# Patient Record
Sex: FEMALE | Race: WHITE | Employment: FULL TIME | URBAN - METROPOLITAN AREA
[De-identification: names, ages, dates, MRNs, and addresses within clinical notes are randomized per-mention and may not be internally consistent; named-entity substitution may affect disease eponyms.]

---

## 2017-02-07 ENCOUNTER — RADIANT APPOINTMENT (OUTPATIENT)
Dept: GENERAL RADIOLOGY | Facility: CLINIC | Age: 57
End: 2017-02-07
Attending: PHYSICIAN ASSISTANT
Payer: COMMERCIAL

## 2017-02-07 ENCOUNTER — OFFICE VISIT (OUTPATIENT)
Dept: URGENT CARE | Facility: URGENT CARE | Age: 57
End: 2017-02-07
Payer: COMMERCIAL

## 2017-02-07 VITALS
OXYGEN SATURATION: 98 % | TEMPERATURE: 98 F | SYSTOLIC BLOOD PRESSURE: 120 MMHG | HEART RATE: 84 BPM | HEIGHT: 68 IN | DIASTOLIC BLOOD PRESSURE: 70 MMHG

## 2017-02-07 DIAGNOSIS — M25.572 ACUTE LEFT ANKLE PAIN: ICD-10-CM

## 2017-02-07 DIAGNOSIS — S80.12XA TRAUMATIC HEMATOMA OF LEFT LOWER LEG, INITIAL ENCOUNTER: ICD-10-CM

## 2017-02-07 DIAGNOSIS — S81.802A OPEN WOUND OF LOWER LIMB, LEFT, INITIAL ENCOUNTER: ICD-10-CM
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
## 2017-02-07 DIAGNOSIS — S80.12XA TRAUMATIC HEMATOMA OF LEFT LOWER LEG, INITIAL ENCOUNTER: Primary | ICD-10-CM

## 2017-02-07 PROCEDURE — 73610 X-RAY EXAM OF ANKLE: CPT | Mod: LT

## 2017-02-07 PROCEDURE — 90714 TD VACC NO PRESV 7 YRS+ IM: CPT | Performed by: PHYSICIAN ASSISTANT

## 2017-02-07 PROCEDURE — 73590 X-RAY EXAM OF LOWER LEG: CPT | Mod: LT

## 2017-02-07 PROCEDURE — 90471 IMMUNIZATION ADMIN: CPT | Performed by: PHYSICIAN ASSISTANT

## 2017-02-07 PROCEDURE — 99214 OFFICE O/P EST MOD 30 MIN: CPT | Mod: 25 | Performed by: PHYSICIAN ASSISTANT

## 2017-02-07 RX ORDER — HYDROCODONE BITARTRATE AND ACETAMINOPHEN 5; 325 MG/1; MG/1
1-2 TABLET ORAL EVERY 6 HOURS PRN
Qty: 20 TABLET | Refills: 0 | Status: SHIPPED | OUTPATIENT
Start: 2017-02-07 | End: 2018-03-14

## 2017-02-07 RX ORDER — IBUPROFEN 600 MG/1
600 TABLET, FILM COATED ORAL EVERY 6 HOURS PRN
Qty: 3 TABLET | Refills: 0
Start: 2017-02-07 | End: 2018-03-14

## 2017-02-07 NOTE — PROGRESS NOTES
"SUBJECTIVE:  Chief Complaint   Patient presents with     Trauma     Pt slipped and fell on her stairs today and injured her left lower leg.     Urgent Care     Racheal Larsen is a 57 year old female presents with a chief complaint of left ankle and lower leg injury from falling on ice.  The injury occurred 2 hours(s) ago.   The injury happened while at home. How: fall on the ice.  The patient complained of mild and moderate pain  and has had decreased ROM.  Pain exacerbated by walking and weight-bearing.  Relieved by ice.  She treated it initially with ice. This is the first time this type of injury has occurred to this patient.     Past Medical History   Diagnosis Date     Depressive disorder      Thyroid disease      PE (pulmonary embolism)      ALLERGIES  No Known Allergies     Social History   Substance Use Topics     Smoking status: Never Smoker      Smokeless tobacco: Never Used     Alcohol Use: No       ROS:  CONSTITUTIONAL:NEGATIVE for fever, chills, change in weight  INTEGUMENTARY/SKIN: POSITIVE for abrasion of left lower leg  ENT/MOUTH: NEGATIVE for ear, mouth and throat problems  RESP:NEGATIVE for significant cough or SOB  CV: NEGATIVE for chest pain, palpitations or peripheral edema  GI: NEGATIVE for nausea, abdominal pain, heartburn, or change in bowel habits  MUSCULOSKELETAL: Positive for left lower leg tenderness, localized hematoma on leg and left ankle pain  NEURO: NEGATIVE for weakness, dizziness or paresthesias    EXAM:   /70 mmHg  Pulse 84  Temp(Src) 98  F (36.7  C)  Ht 5' 8\" (1.727 m)  SpO2 98%  Gen: healthy, alert, active and healthy,alert,no distress  Extremity: left lower leg has anterior leg hematoma, bruising.   There is not compromise to the distal circulation.  Pulses are +2 and CRT is brisk  EXTREMITIES: peripheral pulses normal  MS:  Positive for left ankle tenderness, localized pain.  Positive for large anterior leg hematoma  SKIN: Positive for left anterior leg hematoma " and abrasion  NEURO: Normal strength and tone, sensory exam grossly normal, mentation intact and speech normal    X-RAY was done and negative for acute changes including fracture Xray read by Dung Rasheed at time of visit    ASSESSMENT/PLAN:      ICD-10-CM    1. Traumatic hematoma of left lower leg, initial encounter S80.12XA ibuprofen (ADVIL/MOTRIN) 600 MG tablet     XR Tibia & Fibula Left 2 Views     HYDROcodone-acetaminophen (NORCO) 5-325 MG per tablet   2. Acute left ankle pain M25.572 ibuprofen (ADVIL/MOTRIN) 600 MG tablet     XR Tibia & Fibula Left 2 Views     XR Ankle Left G/E 3 Views     HYDROcodone-acetaminophen (NORCO) 5-325 MG per tablet   3. Fall from slipping on ice, initial encounter W00.9XXA ibuprofen (ADVIL/MOTRIN) 600 MG tablet     XR Tibia & Fibula Left 2 Views   4. Open wound of lower limb, left, initial encounter S81.802A TD PRESERV FREE >=7 YRS ADS IM     RICE treatment: Rest, Ice, compression, elevation   Ace wrap for comfort  Follow up with PCP as needed

## 2017-02-07 NOTE — Clinical Note
[facility] [place] [facility]  [address] [address]  [phone]      February 7, 2017    RE:  Racheal Larsen                                                                                                                                                       [address] [facility] [address]            To whom it may concern:    Racheal Larsen was seen in the urgent care today for     Traumatic hematoma of left lower leg, initial encounter  Acute left ankle pain  Fall from slipping on ice, initial encounter  Open wound of lower limb, left, initial encounter.          Sincerely,        Dung Rasheed [facility] PAC    [facility]

## 2017-02-07 NOTE — NURSING NOTE
"Chief Complaint   Patient presents with     Trauma     Pt slipped and fell on her stairs today and injured her left lower leg.     Urgent Care       Initial /70 mmHg  Pulse 84  Temp(Src) 98  F (36.7  C)  Ht 5' 8\" (1.727 m)  SpO2 98% Estimated body mass index is 28.14 kg/(m^2) as calculated from the following:    Height as of this encounter: 5' 8\" (1.727 m).    Weight as of 10/5/16: 185 lb (83.915 kg).  Medication Reconciliation: complete    "

## 2017-02-07 NOTE — NURSING NOTE
Screening Questionnaire for Adult Immunization    Are you sick today?   No   Do you have allergies to medications, food, a vaccine component or latex?   No   Have you ever had a serious reaction after receiving a vaccination?   No   Do you have a long-term health problem with heart disease, lung disease, asthma, kidney disease, metabolic disease (e.g. diabetes), anemia, or other blood disorder?   No   Do you have cancer, leukemia, HIV/AIDS, or any other immune system problem?   No   In the past 3 months, have you taken medications that affect  your immune system, such as prednisone, other steroids, or anticancer drugs; drugs for the treatment of rheumatoid arthritis, Crohn s disease, or psoriasis; or have you had radiation treatments?   No   Have you had a seizure, or a brain or other nervous system problem?   No   During the past year, have you received a transfusion of blood or blood     products, or been given immune (gamma) globulin or antiviral drug?   No   For women: Are you pregnant or is there a chance you could become        pregnant during the next month?   No   Have you received any vaccinations in the past 4 weeks?   No     Immunization questionnaire answers were all negative.      MNVFC doesn't apply on this patient    Per orders of Dung Rasheed PA-C, injection of TD given by Alicia Luz. Patient instructed to remain in clinic for 20 minutes afterwards, and to report any adverse reaction to me immediately.       Screening performed by Alicia Luz on 2/7/2017 at 12:22 PM.

## 2018-03-14 ENCOUNTER — RADIANT APPOINTMENT (OUTPATIENT)
Dept: GENERAL RADIOLOGY | Facility: CLINIC | Age: 58
End: 2018-03-14
Attending: PHYSICIAN ASSISTANT
Payer: COMMERCIAL

## 2018-03-14 ENCOUNTER — OFFICE VISIT (OUTPATIENT)
Dept: URGENT CARE | Facility: URGENT CARE | Age: 58
End: 2018-03-14
Payer: COMMERCIAL

## 2018-03-14 VITALS
HEART RATE: 79 BPM | OXYGEN SATURATION: 97 % | SYSTOLIC BLOOD PRESSURE: 117 MMHG | WEIGHT: 193 LBS | TEMPERATURE: 98.1 F | RESPIRATION RATE: 10 BRPM | DIASTOLIC BLOOD PRESSURE: 68 MMHG | BODY MASS INDEX: 29.35 KG/M2

## 2018-03-14 DIAGNOSIS — R50.9 FEVER AND CHILLS: ICD-10-CM

## 2018-03-14 DIAGNOSIS — R06.2 WHEEZING: ICD-10-CM

## 2018-03-14 DIAGNOSIS — R05.8 PRODUCTIVE COUGH: Primary | ICD-10-CM

## 2018-03-14 LAB
FLUAV+FLUBV AG SPEC QL: NEGATIVE
FLUAV+FLUBV AG SPEC QL: NEGATIVE
SPECIMEN SOURCE: NORMAL

## 2018-03-14 PROCEDURE — 94640 AIRWAY INHALATION TREATMENT: CPT | Performed by: PHYSICIAN ASSISTANT

## 2018-03-14 PROCEDURE — 71046 X-RAY EXAM CHEST 2 VIEWS: CPT | Mod: FY

## 2018-03-14 PROCEDURE — 87804 INFLUENZA ASSAY W/OPTIC: CPT | Performed by: PHYSICIAN ASSISTANT

## 2018-03-14 PROCEDURE — 99214 OFFICE O/P EST MOD 30 MIN: CPT | Mod: 25 | Performed by: PHYSICIAN ASSISTANT

## 2018-03-14 RX ORDER — CODEINE PHOSPHATE AND GUAIFENESIN 10; 100 MG/5ML; MG/5ML
1-2 SOLUTION ORAL EVERY 4 HOURS PRN
Qty: 240 ML | Refills: 0 | Status: SHIPPED | OUTPATIENT
Start: 2018-03-14 | End: 2020-05-26

## 2018-03-14 RX ORDER — ALBUTEROL SULFATE 0.83 MG/ML
SOLUTION RESPIRATORY (INHALATION)
Qty: 1 VIAL | Refills: 0
Start: 2018-03-14 | End: 2020-05-26

## 2018-03-14 RX ORDER — ALBUTEROL SULFATE 90 UG/1
2 AEROSOL, METERED RESPIRATORY (INHALATION) EVERY 6 HOURS PRN
Qty: 1 INHALER | Refills: 0 | Status: SHIPPED | OUTPATIENT
Start: 2018-03-14 | End: 2020-05-26

## 2018-03-14 RX ORDER — PREDNISONE 20 MG/1
20 TABLET ORAL DAILY
Qty: 5 TABLET | Refills: 0 | Status: SHIPPED | OUTPATIENT
Start: 2018-03-14 | End: 2020-05-26

## 2018-03-14 RX ORDER — DOXYCYCLINE 100 MG/1
100 CAPSULE ORAL 2 TIMES DAILY
Qty: 28 CAPSULE | Refills: 0 | Status: SHIPPED | OUTPATIENT
Start: 2018-03-14 | End: 2018-03-28

## 2018-03-14 NOTE — LETTER
[facility] [place] [facility]  [address] [address]  [phone]      March 14, 2018    RE:  Racheal Larsen                                                                                                                                                       [address] [address] [facility] [address]            To whom it may concern:    Racheal Larsen was seen in clinic today for illness.  She may return to work when she has been fever free for 24 hours.          Sincerely,        Yoko Lugo [facility]

## 2018-03-14 NOTE — MR AVS SNAPSHOT
After Visit Summary   3/14/2018    Racheal Larsen    MRN: [id number]           Patient Information     Date Of Birth          1960        Visit Information        Provider Department      3/14/2018 9:15 AM Yoko Choudhary PA-C [facility]        Today's Diagnoses     Productive cough    -  1    Fever and chills        Wheezing          Care Instructions    (R05) Productive cough  (primary encounter diagnosis)  Comment:   Plan: predniSONE (DELTASONE) 20 MG tablet,         doxycycline monohydrate 100 MG capsule,         guaiFENesin-codeine (ROBITUSSIN AC) 100-10         MG/5ML SOLN solution            (R50.9) Fever and chills  Comment:   Plan: Influenza A/B antigen, ALBUTEROL UNIT DOSE, 1         MG -             (R06.2) Wheezing  Comment:   Plan: INHALATION/NEBULIZER TREATMENT, INITIAL,         albuterol (2.5 MG/3ML) 0.083% neb solution, XR         Chest 2 Views, ALBUTEROL UNIT DOSE, 1 MG -         , albuterol (PROAIR HFA/PROVENTIL         HFA/VENTOLIN HFA) 108 (90 BASE) MCG/ACT Inhaler          Follow up with Primary clinic in 48-72 hours for re-check, sooner should symptoms persist, to ED should symptoms worsen.            Follow-ups after your visit        Who to contact     If you have questions or need follow up information about today's clinic visit or your schedule please contact [facility] directly at [phone].  Normal or non-critical lab and imaging results will be communicated to you by [email or web address], letter or phone within 4 business days after the clinic has received the results. If you do not hear from us within 7 days, please contact the clinic through [email or web address] or phone. If you have a critical or abnormal lab result, we will notify you by phone as soon as possible.  Submit refill requests through [employer] or call your pharmacy and they will forward the refill request to us. Please allow 3 business days for  "your refill to be completed.          Additional Information About Your Visit        [employer] Information     citizenmade lets you send messages to your doctor, view your test results, renew your prescriptions, schedule appointments and more. To sign up, go to [email or web address] . Click on \"Log in\" on the left side of the screen, which will take you to the Welcome page. Then click on \"Sign up Now\" on the right side of the page.     You will be asked to enter the access code listed below, as well as some personal information. Please follow the directions to create your username and password.     Your access code is: [email or web address]  Expires: 2018 11:06 AM     Your access code will  in 90 days. If you need help or a new code, please call your [facility] clinic or [phone].        Care EveryWhere ID     This is your Care EveryWhere ID. This could be used by other organizations to access your [facility] medical records  [id number]        Your Vitals Were     Pulse Temperature Respirations Pulse Oximetry BMI (Body Mass Index)       79 98.1  F (36.7  C) (Oral) 10 97% 29.35 kg/m2        Blood Pressure from Last 3 Encounters:   18 117/68   17 120/70   10/05/16 115/75    Weight from Last 3 Encounters:   18 193 lb (87.5 kg)   10/05/16 185 lb (83.9 kg)   12/14/15 172 lb 11.2 oz (78.3 kg)              We Performed the Following     ALBUTEROL UNIT DOSE, 1 MG -      Influenza A/B antigen     INHALATION/NEBULIZER TREATMENT, INITIAL     Nursing Communication 1          Today's Medication Changes          These changes are accurate as of 3/14/18 11:06 AM.  If you have any questions, ask your nurse or doctor.               Start taking these medicines.        Dose/Directions    doxycycline monohydrate 100 MG capsule   Used for:  Productive cough   Started by:  Yoko Choudhary PA-C        Dose:  100 mg   Take 1 capsule (100 mg) by mouth 2 times daily for 14 days   Quantity:  28 capsule "   Refills:  0       guaiFENesin-codeine 100-10 MG/5ML Soln solution   Commonly known as:  ROBITUSSIN AC   Used for:  Productive cough   Started by:  Yoko Choudhary PA-C        Dose:  1-2 tsp.   Take 5-10 mLs by mouth every 4 hours as needed for cough   Quantity:  240 mL   Refills:  0       predniSONE 20 MG tablet   Commonly known as:  DELTASONE   Used for:  Productive cough   Started by:  Yoko Choudhary PA-C        Dose:  20 mg   Take 1 tablet (20 mg) by mouth daily   Quantity:  5 tablet   Refills:  0         These medicines have changed or have updated prescriptions.        Dose/Directions    * albuterol (2.5 MG/3ML) 0.083% neb solution   This may have changed:    - how much to take  - how to take this  - when to take this  - additional instructions   Used for:  Wheezing   Changed by:  Yoko Choudhary PA-C        In clinic   Quantity:  1 vial   Refills:  0       * albuterol 108 (90 BASE) MCG/ACT Inhaler   Commonly known as:  PROAIR HFA/PROVENTIL HFA/VENTOLIN HFA   This may have changed:  You were already taking a medication with the same name, and this prescription was added. Make sure you understand how and when to take each.   Used for:  Wheezing   Changed by:  Yoko Choudhary PA-C        Dose:  2 puff   Inhale 2 puffs into the lungs every 6 hours as needed for shortness of breath / dyspnea or wheezing   Quantity:  1 Inhaler   Refills:  0       * Notice:  This list has 2 medication(s) that are the same as other medications prescribed for you. Read the directions carefully, and ask your doctor or other care provider to review them with you.      Stop taking these medicines if you haven't already. Please contact your care team if you have questions.     HYDROcodone-acetaminophen 5-325 MG per tablet   Commonly known as:  NORCO   Stopped by:  Yoko Choudhary PA-C           ibuprofen 600 MG tablet   Commonly known as:  ADVIL/MOTRIN   Stopped by:  Megan  Yoko Lugo PA-C                Where to get your medicines      These medications were sent to [facility] - [place], [place] - [address] AT [facility] [address] [facility] [address]    Hours:  24-hours Phone:  [phone]     albuterol 108 (90 BASE) MCG/ACT Inhaler    doxycycline monohydrate 100 MG capsule    predniSONE 20 MG tablet         Some of these will need a paper prescription and others can be bought over the counter.  Ask your nurse if you have questions.     Bring a paper prescription for each of these medications     guaiFENesin-codeine 100-10 MG/5ML Soln solution       You don't need a prescription for these medications     albuterol (2.5 MG/3ML) 0.083% neb solution                Primary Care Provider Office Phone # Fax #    Marychuy Grimes [phone]       [facility] [address]  [facility] [address]        Equal Access to Services     [facility]: Hadii aad ku hadasho Soomaali, waaxda luqadaha, qaybta kaalmada adeegyada, waxay idiin hayaan adeeg kharapolina arthur . [facility] [phone].    ATENCIÓN: Si habla español, tiene a cronin disposición servicios gratuitos de asistencia lingüística. Llame al [phone].    We comply with applicable federal civil rights laws and [place] laws. We do not discriminate on the basis of race, color, national origin, age, disability, sex, sexual orientation, or gender identity.            Thank you!     Thank you for choosing [facility]  for your care. Our goal is always to provide you with excellent care. Hearing back from our patients is one way we can continue to improve our services. Please take a few minutes to complete the written survey that you may receive in the mail after your visit with us. Thank you!             Your Updated Medication List - Protect others around you: Learn how to safely use, store and throw away your medicines at  www.disposemymeds.org.          This list is accurate as of 3/14/18 11:06 AM.  Always use your most recent med list.                   Brand Name Dispense Instructions for use Diagnosis    * albuterol (2.5 MG/3ML) 0.083% neb solution     1 vial    In clinic    Wheezing       * albuterol 108 (90 BASE) MCG/ACT Inhaler    PROAIR HFA/PROVENTIL HFA/VENTOLIN HFA    1 Inhaler    Inhale 2 puffs into the lungs every 6 hours as needed for shortness of breath / dyspnea or wheezing    Wheezing       doxycycline monohydrate 100 MG capsule     28 capsule    Take 1 capsule (100 mg) by mouth 2 times daily for 14 days    Productive cough       guaiFENesin-codeine 100-10 MG/5ML Soln solution    ROBITUSSIN AC    240 mL    Take 5-10 mLs by mouth every 4 hours as needed for cough    Productive cough       LEVOTHROID PO      Take 75 mcg by mouth daily        LORazepam 0.5 MG tablet    ATIVAN    20 tablet    Take 1 tablet (0.5 mg) by mouth every 8 hours as needed for anxiety        predniSONE 20 MG tablet    DELTASONE    5 tablet    Take 1 tablet (20 mg) by mouth daily    Productive cough       * Notice:  This list has 2 medication(s) that are the same as other medications prescribed for you. Read the directions carefully, and ask your doctor or other care provider to review them with you.

## 2018-03-14 NOTE — PROGRESS NOTES
SUBJECTIVE:   Racheal Larsen is a 58 year old female presenting with a chief complaint of   1) productive cough with chest discomfort for 2 days  2) sinus congestion for 10 days.  3) body aches since last night  Onset of symptoms was as above.  Course of illness is worsening.    Severity moderate  Current and Associated symptoms: fever last night 101.4.  Malaise.    Has not taken any medications today and is afebrile in clinic  Treatment measures tried include none.  Predisposing factors include DID have flu vaccination this season.    SH: patient is here with her spouse Tricia    Past Medical History:   Diagnosis Date     Depressive disorder      PE (pulmonary embolism)      Thyroid disease      Patient Active Problem List   Diagnosis     Actinic keratosis     Adjustment disorder with mixed anxiety and depressed mood     Grief     Mixed anxiety depressive disorder     Depression     Dysthymia     Hypercholesterolemia     Stress incontinence in female     Headache     Hypothyroidism     Atypical nevus     Sleep disorder     Social History   Substance Use Topics     Smoking status: Never Smoker     Smokeless tobacco: Never Used     Alcohol use No       ROS:  CONSTITUTIONAL:as per HPI  INTEGUMENTARY/SKIN: NEGATIVE for worrisome rashes, moles or lesions  EYES: NEGATIVE for vision changes or irritation  ENT/MOUTH: as per HPI  RESP:as per HPI  CV: NEGATIVE for chest pain, palpitations or peripheral edema  GI: NEGATIVE for nausea, abdominal pain, heartburn, or change in bowel habits  MUSCULOSKELETAL: as per HPI  Review of systems negative except as stated above.    OBJECTIVE  :/68  Pulse 79  Temp 98.1  F (36.7  C) (Oral)  Resp 10  Wt 193 lb (87.5 kg)  SpO2 97%  BMI 29.35 kg/m2  GENERAL APPEARANCE: healthy, alert and no distress  EYES: EOMI,  PERRL, conjunctiva clear  HENT: ear canals and TM's normal.  Nose and mouth without ulcers, erythema or lesions  NECK: supple, nontender, no lymphadenopathy  RESP:  wheezing throughout which improves with neb but does not clear  CV: regular rates and rhythm, normal S1 S2, no murmur noted  ABDOMEN:  soft, nontender, no HSM or masses and bowel sounds normal  NEURO: Normal strength and tone, sensory exam grossly normal,  normal speech and mentation  SKIN: no suspicious lesions or rashes    CXR: possible infiltrate    (R05) Productive cough  (primary encounter diagnosis)  Comment:   Plan: predniSONE (DELTASONE) 20 MG tablet,         doxycycline monohydrate 100 MG capsule,         guaiFENesin-codeine (ROBITUSSIN AC) 100-10         MG/5ML SOLN solution            (R50.9) Fever and chills  Comment:   Plan: Influenza A/B antigen, ALBUTEROL UNIT DOSE, 1         MG -             (R06.2) Wheezing  Comment:   Plan: INHALATION/NEBULIZER TREATMENT, INITIAL,         albuterol (2.5 MG/3ML) 0.083% neb solution, XR         Chest 2 Views, ALBUTEROL UNIT DOSE, 1 MG -         , albuterol (PROAIR HFA/PROVENTIL         HFA/VENTOLIN HFA) 108 (90 BASE) MCG/ACT Inhaler          Follow up with Primary clinic in 48-72 hours for re-check, sooner should symptoms persist, to ED should symptoms worsen.      Patient and her spouse express understanding and agreement with the assessment and plan as above.

## 2018-03-14 NOTE — PATIENT INSTRUCTIONS
(R05) Productive cough  (primary encounter diagnosis)  Comment:   Plan: predniSONE (DELTASONE) 20 MG tablet,         doxycycline monohydrate 100 MG capsule,         guaiFENesin-codeine (ROBITUSSIN AC) 100-10         MG/5ML SOLN solution            (R50.9) Fever and chills  Comment:   Plan: Influenza A/B antigen, ALBUTEROL UNIT DOSE, 1         MG -             (R06.2) Wheezing  Comment:   Plan: INHALATION/NEBULIZER TREATMENT, INITIAL,         albuterol (2.5 MG/3ML) 0.083% neb solution, XR         Chest 2 Views, ALBUTEROL UNIT DOSE, 1 MG -         , albuterol (PROAIR HFA/PROVENTIL         HFA/VENTOLIN HFA) 108 (90 BASE) MCG/ACT Inhaler          Follow up with Primary clinic in 48-72 hours for re-check, sooner should symptoms persist, to ED should symptoms worsen.

## 2018-04-02 DIAGNOSIS — R06.2 WHEEZING: ICD-10-CM

## 2018-04-02 RX ORDER — ALBUTEROL SULFATE 90 UG/1
AEROSOL, METERED RESPIRATORY (INHALATION)
Qty: 8.5 G | Refills: 0 | OUTPATIENT
Start: 2018-04-02

## 2019-07-26 ENCOUNTER — HOSPITAL LABORATORY (OUTPATIENT)
Dept: OTHER | Facility: CLINIC | Age: 59
End: 2019-07-26

## 2019-07-28 LAB
BACTERIA SPEC CULT: NORMAL
Lab: NORMAL
SPECIMEN SOURCE: NORMAL

## 2020-05-26 ENCOUNTER — ANCILLARY PROCEDURE (OUTPATIENT)
Dept: GENERAL RADIOLOGY | Facility: CLINIC | Age: 60
End: 2020-05-26
Attending: PHYSICIAN ASSISTANT
Payer: COMMERCIAL

## 2020-05-26 ENCOUNTER — OFFICE VISIT (OUTPATIENT)
Dept: URGENT CARE | Facility: URGENT CARE | Age: 60
End: 2020-05-26
Payer: COMMERCIAL

## 2020-05-26 VITALS — TEMPERATURE: 98.1 F | DIASTOLIC BLOOD PRESSURE: 78 MMHG | SYSTOLIC BLOOD PRESSURE: 137 MMHG | HEART RATE: 85 BPM

## 2020-05-26 DIAGNOSIS — S99.911A ANKLE INJURY, RIGHT, INITIAL ENCOUNTER: ICD-10-CM

## 2020-05-26 DIAGNOSIS — S99.911A ANKLE INJURY, RIGHT, INITIAL ENCOUNTER: Primary | ICD-10-CM

## 2020-05-26 PROCEDURE — 99214 OFFICE O/P EST MOD 30 MIN: CPT | Performed by: PHYSICIAN ASSISTANT

## 2020-05-26 PROCEDURE — 73610 X-RAY EXAM OF ANKLE: CPT | Mod: RT

## 2020-05-27 NOTE — PATIENT INSTRUCTIONS
Patient Education       (R55.893V) Ankle injury, right, initial encounter  (primary encounter diagnosis)  Comment:   Plan: XR Ankle Right G/E 3 Views,         acetaminophen-codeine (TYLENOL #3) 300-30 MG         tablet          Wear air cast for one week.  Follow up with Ortho should symptoms persist or worsen.        Treating Ankle Sprains  Treatment will depend on how bad your sprain is. For a severe sprain, healing may take 3 months or more.  Right after your injury: Use R.I.C.E.    BIG: Rest: At first, keep weight off the ankle as much as you can. You may be given crutches to help you walk without putting weight on the ankle.    BIG: Ice: Put an ice pack on the ankle for 20 minutes. Remove the pack and wait at least 30 minutes. Repeat for up to 3 days. This helps reduce swelling.    BIG: Compression: To reduce swelling and keep the joint stable, you may need to wrap the ankle with an elastic bandage. For more severe sprains, you may need an ankle brace, a boot, or a cast.    BIG: Elevation: To reduce swelling, keep your ankle raised above your heart when you sit or lie down.  Medicine  Your healthcare provider may suggest oral nonsteroidal anti-inflammatory medicine (NSAIDs), such as ibuprofen. This relieves the pain and helps reduce swelling. Be sure to take your medicine as directed.  Exercises    After about 2 to 3 weeks, you may be given exercises to strengthen the ligaments and muscles in the ankle. Doing these exercises will help prevent another ankle sprain. Exercises may include standing on your toes and then on your heels and doing ankle curls.    Sit on the edge of a sturdy table or lie on your back.    Pull your toes toward you. Then point them away from you. Repeat for 2 to 3 minutes.  Date Last Reviewed: 1/1/2018 2000-2019 [employer] [address] [place] [address], [place] [address]. All rights reserved. This information is not intended as a substitute for professional medical care.  Always follow your healthcare professional's instructions.           Patient Education     Aircast   Traditional splints and casts for the foot and ankle protect the injury by preventing movement at the joints. However, many injuries heal better and faster if the injured joint can be moved, while protected at the same time. This is the reason for using an Aircast.  There are two common type of AirCasts:  1) Air-Stirrup  ankle splint  This is often used to treat ankle sprains. It contains padded air cells in a plastic frame that fits into your shoe. This allows you to walk while preventing the ankle joint from rolling in or out causing re-injury.  Ankle sprains can take 4-6 weeks to heal. Persons with severe injuries or over age 60 may require more time to heal. During that time, you are prone to re-injury by suddenly twisting your ankle again while the ligaments are still weak.  When treating a sprain, the Air-Stirrup splint should be worn whenever walking for at least four weeks, or as long as you continue to have ankle pain. You should continue to wear it at least 6 weeks whenever running, playing sports or any activity where there is increased risk of re-injury. Talk to your doctor for specific advice about the treatment of your condition.  2) SP-Walker  boot  This is a short boot that provides support and protection to the foot and ankle while allowing you to walk. It contains padded air cells that provide compression and help circulation. It is used for both foot and ankle injuries - both sprains and minor fractures. Talk to your doctor for specific advice about the treatment of your condition.  Air-Stirrup  and SP-Walker  are trademarks of [employer].  For more information about their products, see www.aircast.com.    [id number] [employer]. [address] [place] [address], [place] [address]. All rights reserved. This information is not intended as a substitute for professional medical care. Always  follow your healthcare professional's instructions.

## 2020-05-27 NOTE — PROGRESS NOTES
Patient presents with:  Urgent Care: right ankle injury from fall today.     SUBJECTIVE:  Chief Complaint   Patient presents with     Urgent Care     right ankle injury from fall today.      Racheal Larsen is a 60 year old female presents with a chief complaint of right ankle pain.  The injury occurred just prior to arrival in clinic.  She inverted her ankle on mud while walking her dogs.  Denies any other injury.  The injury happened while while walking. How: as aboveimmediate pain.  The patient complained of moderate pain  and has had decreased ROM.  Pain exacerbated by walking and weight-bearing.  Relieved by nothing.  She treated it initially with ice and a cam walker (has her own). This is the first time this type of injury has occurred to this patient.     Past Medical History:   Diagnosis Date     Depressive disorder      PE (pulmonary embolism)      Thyroid disease      Patient Active Problem List   Diagnosis     Actinic keratosis     Adjustment disorder with mixed anxiety and depressed mood     Grief     Mixed anxiety depressive disorder     Depression     Dysthymia     Hypercholesterolemia     Stress incontinence in female     Headache     Hypothyroidism     Atypical nevus     Sleep disorder     Social History     Tobacco Use     Smoking status: Never Smoker     Smokeless tobacco: Never Used   Substance Use Topics     Alcohol use: No       ROS:  CONSTITUTIONAL:NEGATIVE for fever, chills, change in weight  INTEGUMENTARY/SKIN: NEGATIVE for worrisome rashes, moles or lesions  ENT/MOUTH: NEGATIVE for ear, mouth and throat problems  RESP:NEGATIVE for significant cough or SOB  CV: NEGATIVE for chest pain, palpitations or peripheral edema  GI: NEGATIVE for nausea, abdominal pain, heartburn, or change in bowel habits  MUSCULOSKELETAL: as per HPI  NEURO: NEGATIVE for weakness, dizziness or paresthesias  HEME/ALLERGY/IMMUNE: NEGATIVE for bleeding problems  Review of systems negative except as stated  above.    EXAM:   /78   Pulse 85   Temp 98.1  F (36.7  C) (Oral)   Gen: healthy,alert,no distress  Extremity: right ankle has lateral malleolus swelling and tenderness.    There is not compromise to the distal circulation.  SKIN: no suspicious lesions or rashes  NEURO: Normal strength and tone, sensory exam grossly normal, mentation intact and speech normal    X-RAY was done.  No fractures as per my interpretation during clinic visit.        (H87.213P) Ankle injury, right, initial encounter  (primary encounter diagnosis)  Comment:   Plan: XR Ankle Right G/E 3 Views,         acetaminophen-codeine (TYLENOL #3) 300-30 MG         tablet          Wear air cast for one week.  Follow up with Ortho should symptoms persist or worsen.        Patient expresses understanding and agreement with the assessment and plan as above.

## 2022-03-10 ENCOUNTER — APPOINTMENT (OUTPATIENT)
Dept: URBAN - METROPOLITAN AREA CLINIC 253 | Age: 62
Setting detail: DERMATOLOGY
End: 2022-03-14

## 2022-03-10 VITALS — RESPIRATION RATE: 14 BRPM | WEIGHT: 175 LBS | HEIGHT: 68 IN

## 2022-03-10 DIAGNOSIS — L72.0 EPIDERMAL CYST: ICD-10-CM

## 2022-03-10 DIAGNOSIS — L21.8 OTHER SEBORRHEIC DERMATITIS: ICD-10-CM

## 2022-03-10 DIAGNOSIS — L73.8 OTHER SPECIFIED FOLLICULAR DISORDERS: ICD-10-CM

## 2022-03-10 DIAGNOSIS — L57.0 ACTINIC KERATOSIS: ICD-10-CM

## 2022-03-10 DIAGNOSIS — L81.4 OTHER MELANIN HYPERPIGMENTATION: ICD-10-CM

## 2022-03-10 DIAGNOSIS — D49.2 NEOPLASM OF UNSPECIFIED BEHAVIOR OF BONE, SOFT TISSUE, AND SKIN: ICD-10-CM

## 2022-03-10 DIAGNOSIS — D22 MELANOCYTIC NEVI: ICD-10-CM

## 2022-03-10 DIAGNOSIS — D18.0 HEMANGIOMA: ICD-10-CM

## 2022-03-10 DIAGNOSIS — L82.1 OTHER SEBORRHEIC KERATOSIS: ICD-10-CM

## 2022-03-10 DIAGNOSIS — Z71.89 OTHER SPECIFIED COUNSELING: ICD-10-CM

## 2022-03-10 PROBLEM — D18.01 HEMANGIOMA OF SKIN AND SUBCUTANEOUS TISSUE: Status: ACTIVE | Noted: 2022-03-10

## 2022-03-10 PROBLEM — D22.5 MELANOCYTIC NEVI OF TRUNK: Status: ACTIVE | Noted: 2022-03-10

## 2022-03-10 PROCEDURE — OTHER PRESCRIPTION: OTHER

## 2022-03-10 PROCEDURE — OTHER BIOPSY BY SHAVE METHOD: OTHER

## 2022-03-10 PROCEDURE — OTHER MIPS QUALITY: OTHER

## 2022-03-10 PROCEDURE — 99204 OFFICE O/P NEW MOD 45 MIN: CPT | Mod: 25

## 2022-03-10 PROCEDURE — 11102 TANGNTL BX SKIN SINGLE LES: CPT

## 2022-03-10 PROCEDURE — 11103 TANGNTL BX SKIN EA SEP/ADDL: CPT

## 2022-03-10 PROCEDURE — OTHER COUNSELING: OTHER

## 2022-03-10 RX ORDER — TRETIONIN 0.25 MG/G
0.025% CREAM TOPICAL QHS
Qty: 45 | Refills: 1 | Status: ERX | COMMUNITY
Start: 2022-03-10

## 2022-03-10 RX ORDER — FLUOCINONIDE 0.5 MG/ML
0.05% SOLUTION TOPICAL BID
Qty: 60 | Refills: 1 | Status: ERX | COMMUNITY
Start: 2022-03-10

## 2022-03-10 RX ORDER — KETOCONAZOLE 20 MG/ML
2% SHAMPOO, SUSPENSION TOPICAL QD
Qty: 120 | Refills: 5 | Status: ERX | COMMUNITY
Start: 2022-03-10

## 2022-03-10 ASSESSMENT — LOCATION DETAILED DESCRIPTION DERM
LOCATION DETAILED: MID-FRONTAL SCALP
LOCATION DETAILED: LEFT LATERAL BREAST 1-2:00 REGION
LOCATION DETAILED: INFERIOR THORACIC SPINE
LOCATION DETAILED: LEFT DORSAL RING FINGER METACARPOPHALANGEAL JOINT
LOCATION DETAILED: RIGHT SUPERIOR MEDIAL UPPER BACK
LOCATION DETAILED: RIGHT ANTERIOR SHOULDER
LOCATION DETAILED: LEFT LATERAL ABDOMEN
LOCATION DETAILED: LEFT CENTRAL MALAR CHEEK
LOCATION DETAILED: RIGHT ANTERIOR SHOULDER
LOCATION DETAILED: LEFT INFERIOR CENTRAL MALAR CHEEK
LOCATION DETAILED: RIGHT SUPERIOR MEDIAL MALAR CHEEK

## 2022-03-10 ASSESSMENT — LOCATION ZONE DERM
LOCATION ZONE: ARM
LOCATION ZONE: ARM
LOCATION ZONE: SCALP
LOCATION ZONE: FACE
LOCATION ZONE: HAND
LOCATION ZONE: TRUNK

## 2022-03-10 ASSESSMENT — LOCATION SIMPLE DESCRIPTION DERM
LOCATION SIMPLE: RIGHT UPPER BACK
LOCATION SIMPLE: ANTERIOR SCALP
LOCATION SIMPLE: RIGHT SHOULDER
LOCATION SIMPLE: LEFT BREAST
LOCATION SIMPLE: UPPER BACK
LOCATION SIMPLE: LEFT CHEEK
LOCATION SIMPLE: ABDOMEN
LOCATION SIMPLE: RIGHT CHEEK
LOCATION SIMPLE: RIGHT SHOULDER
LOCATION SIMPLE: LEFT HAND

## 2022-03-10 NOTE — PROCEDURE: COUNSELING
Detail Level: Detailed
Detail Level: Zone
Detail Level: Generalized
Patient Specific Counseling (Will Not Stick From Patient To Patient): Discussed acne facial with Lucina VALENCIA \\nOffered a topical retinoid.
Patient Specific Counseling (Will Not Stick From Patient To Patient): She states it bothers her more in the winter.

## 2022-03-10 NOTE — PROCEDURE: BIOPSY BY SHAVE METHOD
Electrodesiccation Text: The wound bed was treated with electrodesiccation after the biopsy was performed.
Validate Note Data (See Information Below): No
Cryotherapy Text: The wound bed was treated with cryotherapy after the biopsy was performed.
Hemostasis: Drysol
Biopsy Method: Dermablade
Render Post-Care Instructions In Note?: yes
X Size Of Lesion In Cm: 0
Information: Selecting Yes will display possible errors in your note based on the variables you have selected. This validation is only offered as a suggestion for you. PLEASE NOTE THAT THE VALIDATION TEXT WILL BE REMOVED WHEN YOU FINALIZE YOUR NOTE. IF YOU WANT TO FAX A PRELIMINARY NOTE YOU WILL NEED TO TOGGLE THIS TO 'NO' IF YOU DO NOT WANT IT IN YOUR FAXED NOTE.
Wound Care: Petrolatum
Anesthesia Type: 2% Xylocaine with epinephrine and sodium bicarbonate
Billing Type: Third-Party Bill
Depth Of Biopsy: dermis
Type Of Destruction Used: Curettage
Silver Nitrate Text: The wound bed was treated with silver nitrate after the biopsy was performed.
Dressing: bandage
Post-Care Instructions: I reviewed with the patient in detail post-care instructions. Patient is to keep the biopsy site dry overnight, and then apply bacitracin twice daily until healed. Patient may apply hydrogen peroxide soaks to remove any crusting.
Anesthesia Volume In Cc (Will Not Render If 0): 0.3
Curettage Text: The wound bed was treated with curettage after the biopsy was performed.
Detail Level: Detailed
Consent: Written consent was obtained and risks were reviewed including but not limited to scarring, infection, bleeding, scabbing, incomplete removal, nerve damage and allergy to anesthesia.
Biopsy Type: H and E
Electrodesiccation And Curettage Text: The wound bed was treated with electrodesiccation and curettage after the biopsy was performed.
Notification Instructions: Patient will be notified of biopsy results. However, patient instructed to call the office if not contacted within 2 weeks.

## 2022-03-14 RX ORDER — TRETIONIN 0.25 MG/G
CREAM TOPICAL QHS
Qty: 45 | Refills: 1 | Status: ERX

## 2023-04-30 ENCOUNTER — HEALTH MAINTENANCE LETTER (OUTPATIENT)
Age: 63
End: 2023-04-30